# Patient Record
Sex: FEMALE | Race: WHITE | NOT HISPANIC OR LATINO | Employment: FULL TIME | URBAN - METROPOLITAN AREA
[De-identification: names, ages, dates, MRNs, and addresses within clinical notes are randomized per-mention and may not be internally consistent; named-entity substitution may affect disease eponyms.]

---

## 2017-06-15 ENCOUNTER — CONVERSION ENCOUNTER (OUTPATIENT)
Dept: OTHER | Facility: HOSPITAL | Age: 52
End: 2017-06-15

## 2017-12-12 ENCOUNTER — CONVERSION ENCOUNTER (OUTPATIENT)
Dept: OTHER | Facility: HOSPITAL | Age: 52
End: 2017-12-12

## 2018-08-09 ENCOUNTER — CONVERSION ENCOUNTER (OUTPATIENT)
Dept: OTHER | Facility: HOSPITAL | Age: 53
End: 2018-08-09

## 2018-08-09 ENCOUNTER — HOSPITAL ENCOUNTER (OUTPATIENT)
Dept: URGENT CARE | Facility: CLINIC | Age: 53
Setting detail: SPECIMEN
Discharge: HOME | End: 2018-08-09
Attending: EMERGENCY MEDICINE | Admitting: EMERGENCY MEDICINE

## 2018-08-10 LAB
BACTERIA SPEC AEROBE CULT: NORMAL
GRAM STN SPEC: NORMAL
Lab: NORMAL
MICRO REPORT STATUS: NORMAL
SPECIMEN SOURCE: NORMAL

## 2018-08-20 ENCOUNTER — CONVERSION ENCOUNTER (OUTPATIENT)
Dept: OTHER | Facility: HOSPITAL | Age: 53
End: 2018-08-20

## 2019-02-07 ENCOUNTER — CONVERSION ENCOUNTER (OUTPATIENT)
Dept: OTHER | Facility: HOSPITAL | Age: 54
End: 2019-02-07

## 2019-02-13 ENCOUNTER — CONVERSION ENCOUNTER (OUTPATIENT)
Dept: OTHER | Facility: HOSPITAL | Age: 54
End: 2019-02-13

## 2019-06-04 VITALS
BODY MASS INDEX: 33.55 KG/M2 | DIASTOLIC BLOOD PRESSURE: 83 MMHG | OXYGEN SATURATION: 96 % | WEIGHT: 192 LBS | HEART RATE: 83 BPM | HEIGHT: 63 IN | HEIGHT: 63 IN | BODY MASS INDEX: 33.55 KG/M2 | HEIGHT: 63 IN | SYSTOLIC BLOOD PRESSURE: 185 MMHG | WEIGHT: 189.38 LBS | DIASTOLIC BLOOD PRESSURE: 103 MMHG | BODY MASS INDEX: 33.84 KG/M2 | HEART RATE: 94 BPM | BODY MASS INDEX: 33.95 KG/M2 | SYSTOLIC BLOOD PRESSURE: 152 MMHG | WEIGHT: 188 LBS | DIASTOLIC BLOOD PRESSURE: 96 MMHG | HEART RATE: 94 BPM | HEART RATE: 99 BPM | WEIGHT: 189.38 LBS | OXYGEN SATURATION: 98 % | HEIGHT: 63 IN | WEIGHT: 191 LBS | HEART RATE: 107 BPM | RESPIRATION RATE: 18 BRPM | SYSTOLIC BLOOD PRESSURE: 157 MMHG | DIASTOLIC BLOOD PRESSURE: 95 MMHG | DIASTOLIC BLOOD PRESSURE: 88 MMHG | HEIGHT: 63 IN | HEART RATE: 98 BPM | DIASTOLIC BLOOD PRESSURE: 105 MMHG | WEIGHT: 191.6 LBS | BODY MASS INDEX: 33.31 KG/M2 | RESPIRATION RATE: 18 BRPM | SYSTOLIC BLOOD PRESSURE: 171 MMHG | SYSTOLIC BLOOD PRESSURE: 145 MMHG | SYSTOLIC BLOOD PRESSURE: 164 MMHG

## 2019-08-15 ENCOUNTER — OFFICE VISIT (OUTPATIENT)
Dept: NEUROLOGY | Facility: CLINIC | Age: 54
End: 2019-08-15

## 2019-08-15 VITALS
SYSTOLIC BLOOD PRESSURE: 161 MMHG | WEIGHT: 192.4 LBS | HEIGHT: 62 IN | HEART RATE: 87 BPM | BODY MASS INDEX: 35.41 KG/M2 | DIASTOLIC BLOOD PRESSURE: 97 MMHG

## 2019-08-15 DIAGNOSIS — G43.809 MIGRAINE VARIANT: ICD-10-CM

## 2019-08-15 DIAGNOSIS — G43.019 INTRACTABLE MIGRAINE WITHOUT AURA AND WITHOUT STATUS MIGRAINOSUS: Primary | ICD-10-CM

## 2019-08-15 PROBLEM — G43.909 MIGRAINE: Status: ACTIVE | Noted: 2019-08-15

## 2019-08-15 PROCEDURE — 99213 OFFICE O/P EST LOW 20 MIN: CPT | Performed by: PSYCHIATRY & NEUROLOGY

## 2019-08-15 RX ORDER — AMITRIPTYLINE HYDROCHLORIDE 50 MG/1
TABLET, FILM COATED ORAL
COMMUNITY
Start: 2013-04-29 | End: 2019-08-15

## 2019-08-15 RX ORDER — TERBINAFINE HYDROCHLORIDE 250 MG/1
TABLET ORAL DAILY
COMMUNITY
Start: 2013-04-29 | End: 2019-08-15

## 2019-08-15 RX ORDER — DIPHENHYDRAMINE HCL 25 MG
1 CAPSULE ORAL
COMMUNITY
End: 2019-08-15

## 2019-08-15 RX ORDER — FREMANEZUMAB-VFRM 225 MG/1.5ML
INJECTION SUBCUTANEOUS
Refills: 0 | COMMUNITY
Start: 2019-07-12 | End: 2019-08-15 | Stop reason: SDUPTHER

## 2019-08-15 RX ORDER — RIZATRIPTAN BENZOATE 10 MG/1
TABLET ORAL
COMMUNITY
Start: 2012-07-20 | End: 2019-08-15

## 2019-08-15 RX ORDER — MULTIVIT,TX WITH IRON,MINERALS
TABLET, EXTENDED RELEASE ORAL DAILY
COMMUNITY
End: 2019-08-15

## 2019-08-15 RX ORDER — AMLODIPINE BESYLATE 5 MG/1
TABLET ORAL DAILY
COMMUNITY
Start: 2012-05-14 | End: 2019-08-15

## 2019-08-15 RX ORDER — ONDANSETRON HYDROCHLORIDE 8 MG/1
TABLET, FILM COATED ORAL EVERY 8 HOURS
COMMUNITY
Start: 2019-02-07 | End: 2019-08-15

## 2019-08-15 RX ORDER — DESOGESTREL AND ETHINYL ESTRADIOL 0.15-0.03
KIT ORAL DAILY
COMMUNITY
End: 2019-08-15

## 2019-08-15 RX ORDER — SUMATRIPTAN 100 MG/1
TABLET, FILM COATED ORAL
COMMUNITY
Start: 2014-08-04 | End: 2021-03-04

## 2019-08-15 RX ORDER — GABAPENTIN 300 MG/1
1 CAPSULE ORAL
COMMUNITY
Start: 2013-02-04 | End: 2019-08-15

## 2019-08-15 RX ORDER — PSEUDOEPHEDRINE HCL 120 MG/1
TABLET, FILM COATED, EXTENDED RELEASE ORAL
COMMUNITY
End: 2019-08-15

## 2019-08-15 RX ORDER — FREMANEZUMAB-VFRM 225 MG/1.5ML
225 INJECTION SUBCUTANEOUS
Qty: 1 SYRINGE | Refills: 5 | Status: SHIPPED | OUTPATIENT
Start: 2019-08-15 | End: 2019-08-18

## 2019-08-15 RX ORDER — DICLOFENAC POTASSIUM 50 MG/1
TABLET, FILM COATED ORAL
COMMUNITY
End: 2019-08-15

## 2019-08-15 RX ORDER — GLUCOSAMINE/CHONDR SU A SOD 750-600 MG
TABLET ORAL DAILY
COMMUNITY
End: 2019-08-15

## 2019-08-15 NOTE — PROGRESS NOTES
Subjective:     Patient ID: Iris Torres is a 54 y.o. female.    History of Present Illness, Headache and Migraines    6 month f/u for headache, migraine, patient with a hx of severe frequent migraines currently taking Imitrex, Zofran and Zanaflex. Patient was given Ajovy at last office visit and still feels she has the migraines without the head pain and still has nausea. Last migraine was on Monday and lasted most of the day usually wakes up with them. Patient has trouble with getting and staying asleep up every two hours did take melatonin but didn't really help.       The following portions of the patient's history were reviewed and updated as appropriate: allergies, current medications, past family history, past medical history, past social history, past surgical history and problem list.      Current Outpatient Medications:   •  AJOVY 225 MG/1.5ML solution prefilled syringe, INJECT 1 SYRINGE ONCE A MONTH, Disp: , Rfl: 0  •  ondansetron (ZOFRAN) 8 MG tablet, Every 8 (Eight) Hours., Disp: , Rfl:   •  SUMAtriptan (IMITREX) 100 MG tablet, IMITREX 100 MG TABS, Disp: , Rfl:     Review of Systems   Constitutional: Positive for fatigue. Negative for appetite change.   HENT: Negative for congestion, sinus pressure and sinus pain.    Eyes: Negative for pain and itching.   Respiratory: Negative for choking and shortness of breath.    Endocrine: Negative for cold intolerance and heat intolerance.   Genitourinary: Negative for difficulty urinating and frequency.   Musculoskeletal: Negative for back pain and gait problem.   Allergic/Immunologic: Negative for environmental allergies.   Neurological: Positive for headaches. Negative for dizziness, tremors, seizures, syncope, facial asymmetry, speech difficulty, light-headedness and numbness.   Psychiatric/Behavioral: Negative for agitation and confusion.          I have reviewed ROS completed by medical assistant.     Objective:    Pt alert and in no  distress      Assessment/Plan:    Iris was seen today for migraine.    Diagnoses and all orders for this visit:    Intractable migraine without aura and without status migrainosus    Migraine variant      Probable migraine spells with out headache and continued migraine headaches.  2-3 days per week  Migraine spells with head pain about one day per week          This document has been electronically signed by Joseph Seipel, MD on August 15, 2019 5:31 PM

## 2019-08-16 ENCOUNTER — TELEPHONE (OUTPATIENT)
Dept: NEUROLOGY | Facility: CLINIC | Age: 54
End: 2019-08-16

## 2019-08-16 NOTE — TELEPHONE ENCOUNTER
I called to do PA on Ajovy, they don't cover it. They do cover Aimovig and Emgality. Do you want to change. If so you will need to send order to her pharmacy. Thanks

## 2019-08-21 ENCOUNTER — TELEPHONE (OUTPATIENT)
Dept: NEUROLOGY | Facility: CLINIC | Age: 54
End: 2019-08-21

## 2020-02-19 ENCOUNTER — OFFICE VISIT (OUTPATIENT)
Dept: NEUROLOGY | Facility: CLINIC | Age: 55
End: 2020-02-19

## 2020-02-19 VITALS
DIASTOLIC BLOOD PRESSURE: 85 MMHG | BODY MASS INDEX: 36.22 KG/M2 | WEIGHT: 196.8 LBS | HEART RATE: 80 BPM | SYSTOLIC BLOOD PRESSURE: 163 MMHG | HEIGHT: 62 IN

## 2020-02-19 DIAGNOSIS — G43.019 INTRACTABLE MIGRAINE WITHOUT AURA AND WITHOUT STATUS MIGRAINOSUS: Primary | ICD-10-CM

## 2020-02-19 DIAGNOSIS — F32.A DEPRESSION, UNSPECIFIED DEPRESSION TYPE: ICD-10-CM

## 2020-02-19 PROCEDURE — 99213 OFFICE O/P EST LOW 20 MIN: CPT | Performed by: PSYCHIATRY & NEUROLOGY

## 2020-02-19 NOTE — PROGRESS NOTES
Subjective: Migraines    Patient ID: Iris Torres is a 55 y.o. female.    Chief complaint: migraine    History of Present Illness, 6 month f/u     Migraine variant, patient doing well with emgality and Imitrex. Patient had her first dose in february so far doing well. Last migraine was last Tuesday and wedneday not as severe and doesn't have them as frequent.   ajovy helped with the migraine, helped 80  To 85%, no noted side effects  Fewer headaches, but has had spells of the migraine associated symptoms wo headache.   Started on Emgality this month.     Pt had blood in urine, no etiology found.     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    Family History   Problem Relation Age of Onset   • Hypertension Mother    • Stroke Father    • Stroke Brother    • Migraines Paternal Aunt        Past Medical History:   Diagnosis Date   • Cellulitis    • Depression    • Migraine        Social History     Socioeconomic History   • Marital status:      Spouse name: Not on file   • Number of children: Not on file   • Years of education: Not on file   • Highest education level: Not on file   Tobacco Use   • Smoking status: Former Smoker   • Smokeless tobacco: Never Used   Substance and Sexual Activity   • Alcohol use: No     Frequency: Never   • Drug use: No         Current Outpatient Medications:   •  galcanezumab-gnlm (EMGALITY) 120 MG/ML prefilled syringe, Inject 1 mL under the skin into the appropriate area as directed Every 30 (Thirty) Days. First day two 120mg doses, then 120 monthly, Disp: 2.24 mL, Rfl: 0  •  SUMAtriptan (IMITREX) 100 MG tablet, IMITREX 100 MG TABS, Disp: , Rfl:      Review of Systems   Constitutional: Negative for appetite change and fatigue.   HENT: Negative for hearing loss and postnasal drip.    Eyes: Negative for discharge and itching.   Respiratory: Negative for cough and shortness of breath.    Gastrointestinal: Negative for constipation and diarrhea.   Endocrine: Negative for cold intolerance and heat intolerance.   Genitourinary: Negative for difficulty urinating and frequency.   Musculoskeletal: Negative for back pain and neck pain.   Allergic/Immunologic: Negative for environmental allergies.   Neurological: Positive for headaches. Negative for dizziness, tremors, seizures, syncope, facial asymmetry, speech difficulty, weakness, light-headedness and numbness.   Psychiatric/Behavioral: Negative for agitation and confusion.          I have reviewed ROS completed by medical assistant.     Objective:    Neurologic Exam     Mental Status   Oriented to person, place, and time.       Physical Exam   Constitutional: She is oriented to person, place, and time. She appears well-developed.   Neurological: She is oriented to person, place, and time.   Psychiatric: She has a normal mood and affect.   Vitals reviewed.      Assessment/Plan:    Iris was seen today for migraine.    Diagnoses and all orders for this visit:    Intractable migraine without aura and without status migrainosus    Depression, unspecified depression type      Continue Emgality. And imitrex.    Depression improved. With less stress and fewer headaches.    This document has been electronically signed by Joseph Seipel, MD on February 19, 2020 4:45 PM

## 2020-09-04 ENCOUNTER — TELEPHONE (OUTPATIENT)
Dept: NEUROLOGY | Facility: CLINIC | Age: 55
End: 2020-09-04

## 2020-09-04 NOTE — TELEPHONE ENCOUNTER
PT STATES THE PRIOR AUTH FOR HER EMGALITY  ON 8-, SHE STATES THAT [facility] NEEDS A NEW ONE TO BE ABLE TO FILL AGAIN. PLEASE ADVISE.         Iris Torres (Self) [phone] (H)

## 2021-03-04 ENCOUNTER — OFFICE VISIT (OUTPATIENT)
Dept: NEUROLOGY | Facility: CLINIC | Age: 56
End: 2021-03-04

## 2021-03-04 VITALS
BODY MASS INDEX: 33.22 KG/M2 | HEART RATE: 83 BPM | TEMPERATURE: 97.5 F | DIASTOLIC BLOOD PRESSURE: 76 MMHG | WEIGHT: 194.6 LBS | HEIGHT: 64 IN | SYSTOLIC BLOOD PRESSURE: 152 MMHG

## 2021-03-04 DIAGNOSIS — G43.119 INTRACTABLE MIGRAINE WITH AURA WITHOUT STATUS MIGRAINOSUS: ICD-10-CM

## 2021-03-04 DIAGNOSIS — E66.09 CLASS 1 OBESITY DUE TO EXCESS CALORIES WITH BODY MASS INDEX (BMI) OF 33.0 TO 33.9 IN ADULT, UNSPECIFIED WHETHER SERIOUS COMORBIDITY PRESENT: Primary | ICD-10-CM

## 2021-03-04 PROBLEM — G43.909 HEADACHE, MIGRAINE: Status: ACTIVE | Noted: 2021-03-04

## 2021-03-04 PROBLEM — Z87.891 HISTORY OF TOBACCO USE: Status: ACTIVE | Noted: 2021-03-04

## 2021-03-04 PROBLEM — R53.81 MALAISE AND FATIGUE: Status: ACTIVE | Noted: 2021-03-04

## 2021-03-04 PROBLEM — F43.0 ACUTE REACTION TO STRESS: Status: ACTIVE | Noted: 2021-03-04

## 2021-03-04 PROBLEM — A08.4 VIRAL INTESTINAL INFECTION: Status: ACTIVE | Noted: 2019-02-07

## 2021-03-04 PROBLEM — E66.9 OBESITY: Status: ACTIVE | Noted: 2021-03-04

## 2021-03-04 PROBLEM — R53.83 MALAISE AND FATIGUE: Status: ACTIVE | Noted: 2021-03-04

## 2021-03-04 PROBLEM — E66.3 OVERWEIGHT: Status: ACTIVE | Noted: 2021-03-04

## 2021-03-04 PROBLEM — R31.29 MICROSCOPIC HEMATURIA: Status: ACTIVE | Noted: 2021-03-04

## 2021-03-04 PROBLEM — B35.3 DERMATOPHYTOSIS OF FOOT: Status: ACTIVE | Noted: 2021-03-04

## 2021-03-04 PROBLEM — Z86.19 HISTORY OF OTHER INFECTIOUS OR PARASITIC DISEASE: Status: ACTIVE | Noted: 2021-03-04

## 2021-03-04 PROBLEM — L02.211 ABSCESS OF SKIN OF ABDOMEN: Status: ACTIVE | Noted: 2018-08-09

## 2021-03-04 PROBLEM — M53.80 OTHER BACK SYMPTOMS: Status: ACTIVE | Noted: 2021-03-04

## 2021-03-04 PROBLEM — L30.9 ECZEMA: Status: ACTIVE | Noted: 2021-03-04

## 2021-03-04 PROBLEM — L03.311 CELLULITIS OF ABDOMINAL WALL: Status: ACTIVE | Noted: 2018-08-09

## 2021-03-04 PROBLEM — F32.A DEPRESSION: Status: ACTIVE | Noted: 2021-03-04

## 2021-03-04 PROBLEM — G43.109 MIGRAINE WITH AURA: Status: ACTIVE | Noted: 2021-03-04

## 2021-03-04 PROBLEM — I10 BENIGN ESSENTIAL HYPERTENSION: Status: ACTIVE | Noted: 2021-03-04

## 2021-03-04 PROBLEM — Z82.3 FAMILY HISTORY OF CEREBROVASCULAR ACCIDENT (CVA): Status: ACTIVE | Noted: 2021-03-04

## 2021-03-04 PROBLEM — Z00.00 ROUTINE GENERAL MEDICAL EXAMINATION AT A HEALTH CARE FACILITY: Status: ACTIVE | Noted: 2021-03-04

## 2021-03-04 PROBLEM — D72.829 LEUKOCYTOSIS: Status: ACTIVE | Noted: 2021-03-04

## 2021-03-04 PROBLEM — L98.9 DISORDER OF SKIN AND SUBCUTANEOUS TISSUE: Status: ACTIVE | Noted: 2021-03-04

## 2021-03-04 PROBLEM — G43.019 INTRACTABLE MIGRAINE WITHOUT AURA: Status: ACTIVE | Noted: 2021-03-04

## 2021-03-04 PROCEDURE — 99213 OFFICE O/P EST LOW 20 MIN: CPT | Performed by: PSYCHIATRY & NEUROLOGY

## 2021-03-04 RX ORDER — ACETAMINOPHEN 500 MG
500 TABLET ORAL EVERY 6 HOURS PRN
COMMUNITY

## 2021-03-04 NOTE — PROGRESS NOTES
"Chief Complaint  Migraine (yearly f/u )    Subjective          Iris Torres presents to [facility]UR[facility] for   Yearly f/u     Intractable migraine without aura and without status migrainosus, treated with Emgality and tylenol. Stable with medication fewer migraines worse with congestion had a few aura's in January and February but very few. Pt had the visual aura, hour or less, aura expands over about 20 min to one half hour.     Pt has had no disabling migraine.       Depression, unspecified depression type, improved with less headaches.     Pt. retired one year ago January.           Current Outpatient Medications:   •  acetaminophen (TYLENOL) 500 MG tablet, Take 500 mg by mouth Every 6 (Six) Hours As Needed for Mild Pain ., Disp: , Rfl:   •  galcanezumab-gnlm (EMGALITY) 120 MG/ML prefilled syringe, Inject 1 mL under the skin into the appropriate area as directed Every 30 (Thirty) Days., Disp: 1 pen, Rfl: 11    Review of Systems   Constitutional: Negative for appetite change and fatigue.   HENT: Negative for hearing loss and postnasal drip.    Eyes: Negative for discharge and itching.   Respiratory: Negative for cough and shortness of breath.    Gastrointestinal: Negative for constipation and diarrhea.   Endocrine: Negative for cold intolerance and heat intolerance.   Genitourinary: Negative for difficulty urinating and frequency.   Musculoskeletal: Negative for back pain and neck pain.   Allergic/Immunologic: Negative for environmental allergies.   Neurological: Positive for headaches. Negative for dizziness, tremors, seizures, syncope, facial asymmetry, speech difficulty, weakness, light-headedness and numbness.   Psychiatric/Behavioral: Negative for agitation and confusion.          Objective:    Vital Signs:   /76 (BP Location: Left arm, Patient Position: Sitting, Cuff Size: Adult)   Pulse 83   Temp 97.5 °F (36.4 °C)   Ht 162.6 cm (64\")   Wt 88.3 kg (194 lb 9.6 oz)   BMI " 33.40 kg/m²     Physical Exam  Neurological:      Mental Status: She is oriented to person, place, and time.        Result Review :                Neurologic Exam     Mental Status   Oriented to person, place, and time.   Attention: normal.   Level of consciousness: alert        Assessment and Plan    Diagnoses and all orders for this visit:    1. Class 1 obesity due to excess calories with body mass index (BMI) of 33.0 to 33.9 in adult, unspecified whether serious comorbidity present (Primary)    2. Intractable migraine with aura without status migrainosus    Other orders  -     galcanezumab-gnlm (EMGALITY) 120 MG/ML prefilled syringe; Inject 1 mL under the skin into the appropriate area as directed Every 30 (Thirty) Days.  Dispense: 1 pen; Refill: 11       Great response to the Emgaility  Continue emgaility for next year..        Follow Up   Return in about 1 year (around 3/4/2022).  Patient was given instructions and counseling regarding her condition or for health maintenance advice. Please see specific information pulled into the AVS if appropriate.             This document has been electronically signed by Joseph Seipel, MD on March 4, 2021 09:11 EST

## 2022-03-07 ENCOUNTER — OFFICE VISIT (OUTPATIENT)
Dept: NEUROLOGY | Facility: CLINIC | Age: 57
End: 2022-03-07

## 2022-03-07 VITALS
SYSTOLIC BLOOD PRESSURE: 148 MMHG | HEIGHT: 64 IN | BODY MASS INDEX: 30.73 KG/M2 | DIASTOLIC BLOOD PRESSURE: 90 MMHG | TEMPERATURE: 97.3 F | WEIGHT: 180 LBS

## 2022-03-07 DIAGNOSIS — G43.809 MIGRAINE VARIANT: Primary | ICD-10-CM

## 2022-03-07 PROCEDURE — 99213 OFFICE O/P EST LOW 20 MIN: CPT | Performed by: PSYCHIATRY & NEUROLOGY

## 2022-03-07 NOTE — PROGRESS NOTES
"Chief Complaint  Headache    Subjective          Iris Torres presents to [facility] NEUROLOGY for headaches  History of Present Illness     Patient is here for follow up on migraines treated with Emality and tylenol.  She has had few to none of migraines since last visit.   Doing much better since retired. And taking the Emgality, and post menopausal     ===========================================  3-4-2021  Iris Torres presents to [facility] NEUROLOGY for   Yearly f/u      Intractable migraine without aura and without status migrainosus, treated with Emgality and tylenol. Stable with medication fewer migraines worse with congestion had a few aura's in January and February but very few. Pt had the visual aura, hour or less, aura expands over about 20 min to one half hour.      Pt has had no disabling migraine.       Depression, unspecified depression type, improved with less headaches.      Pt. retired one year ago January.           Current Outpatient Medications:   •  acetaminophen (TYLENOL) 500 MG tablet, Take 500 mg by mouth Every 6 (Six) Hours As Needed for Mild Pain ., Disp: , Rfl:   •  galcanezumab-gnlm (EMGALITY) 120 MG/ML prefilled syringe, Inject 1 mL under the skin into the appropriate area as directed Every 30 (Thirty) Days., Disp: 1 pen, Rfl: 11    Review of Systems   Constitutional: Negative.    HENT: Negative.    Eyes: Negative.    Respiratory: Negative.    Gastrointestinal: Negative.    Genitourinary: Negative.    Musculoskeletal: Negative.    Neurological: Negative.    Psychiatric/Behavioral: Negative.           Objective:    Vital Signs:   /90 (BP Location: Left arm, Patient Position: Sitting)   Temp 97.3 °F (36.3 °C)   Ht 162.6 cm (64\")   Wt 81.6 kg (180 lb)   BMI 30.90 kg/m²     Physical Exam  Vitals reviewed.   HENT:      Head: Normocephalic.   Pulmonary:      Effort: Pulmonary effort is normal. No respiratory distress.   Neurological:      " General: No focal deficit present.      Mental Status: She is alert.   Psychiatric:         Mood and Affect: Mood normal.        Result Review :                  Assessment and Plan    Diagnoses and all orders for this visit:    1. Migraine variant (Primary)     No migraine in past year  Will discontinue the Emgality now    Hopefully will stay with out headaches now that she is older and retired.     Follow Up   Return if symptoms worsen or fail to improve.  Patient was given instructions and counseling regarding her condition or for health maintenance advice. Please see specific information pulled into the AVS if appropriate.     This document has been electronically signed by Joseph Seipel, MD on March 7, 2022 15:50 EST

## 2024-09-17 ENCOUNTER — TREATMENT (OUTPATIENT)
Dept: PHYSICAL THERAPY | Facility: CLINIC | Age: 59
End: 2024-09-17
Payer: COMMERCIAL

## 2024-09-17 DIAGNOSIS — M25.60 JOINT STIFFNESS OF SPINE: ICD-10-CM

## 2024-09-17 DIAGNOSIS — M62.81 WEAKNESS OF TRUNK MUSCULATURE: ICD-10-CM

## 2024-09-17 DIAGNOSIS — M54.42 ACUTE LEFT-SIDED LOW BACK PAIN WITH LEFT-SIDED SCIATICA: Primary | ICD-10-CM

## 2024-09-17 PROCEDURE — 97110 THERAPEUTIC EXERCISES: CPT

## 2024-09-17 PROCEDURE — 97161 PT EVAL LOW COMPLEX 20 MIN: CPT

## 2024-09-17 PROCEDURE — 97112 NEUROMUSCULAR REEDUCATION: CPT

## 2024-09-19 ENCOUNTER — TREATMENT (OUTPATIENT)
Dept: PHYSICAL THERAPY | Facility: CLINIC | Age: 59
End: 2024-09-19
Payer: COMMERCIAL

## 2024-09-19 DIAGNOSIS — M54.42 ACUTE LEFT-SIDED LOW BACK PAIN WITH LEFT-SIDED SCIATICA: Primary | ICD-10-CM

## 2024-09-19 DIAGNOSIS — M25.60 JOINT STIFFNESS OF SPINE: ICD-10-CM

## 2024-09-19 DIAGNOSIS — M62.81 WEAKNESS OF TRUNK MUSCULATURE: ICD-10-CM

## 2024-09-24 ENCOUNTER — TREATMENT (OUTPATIENT)
Dept: PHYSICAL THERAPY | Facility: CLINIC | Age: 59
End: 2024-09-24
Payer: COMMERCIAL

## 2024-09-24 DIAGNOSIS — M62.81 WEAKNESS OF TRUNK MUSCULATURE: ICD-10-CM

## 2024-09-24 DIAGNOSIS — M25.60 JOINT STIFFNESS OF SPINE: ICD-10-CM

## 2024-09-24 DIAGNOSIS — M54.42 ACUTE LEFT-SIDED LOW BACK PAIN WITH LEFT-SIDED SCIATICA: Primary | ICD-10-CM

## 2024-09-24 PROCEDURE — 97110 THERAPEUTIC EXERCISES: CPT | Performed by: PHYSICAL THERAPIST

## 2024-09-24 PROCEDURE — 97112 NEUROMUSCULAR REEDUCATION: CPT | Performed by: PHYSICAL THERAPIST

## 2024-09-26 ENCOUNTER — TREATMENT (OUTPATIENT)
Dept: PHYSICAL THERAPY | Facility: CLINIC | Age: 59
End: 2024-09-26
Payer: COMMERCIAL

## 2024-09-26 DIAGNOSIS — M25.60 JOINT STIFFNESS OF SPINE: ICD-10-CM

## 2024-09-26 DIAGNOSIS — M54.42 ACUTE LEFT-SIDED LOW BACK PAIN WITH LEFT-SIDED SCIATICA: Primary | ICD-10-CM

## 2024-09-26 DIAGNOSIS — M62.81 WEAKNESS OF TRUNK MUSCULATURE: ICD-10-CM

## 2024-10-01 ENCOUNTER — TREATMENT (OUTPATIENT)
Dept: PHYSICAL THERAPY | Facility: CLINIC | Age: 59
End: 2024-10-01
Payer: COMMERCIAL

## 2024-10-01 DIAGNOSIS — M25.60 JOINT STIFFNESS OF SPINE: ICD-10-CM

## 2024-10-01 DIAGNOSIS — M54.42 ACUTE LEFT-SIDED LOW BACK PAIN WITH LEFT-SIDED SCIATICA: Primary | ICD-10-CM

## 2024-10-01 DIAGNOSIS — M62.81 WEAKNESS OF TRUNK MUSCULATURE: ICD-10-CM

## 2024-10-01 PROCEDURE — 97110 THERAPEUTIC EXERCISES: CPT

## 2024-10-01 PROCEDURE — 97112 NEUROMUSCULAR REEDUCATION: CPT

## 2024-10-01 NOTE — PROGRESS NOTES
Physical Therapy Daily Treatment Note                        [address] [place] [address] [address] [place] [address]    Patient: Iris Torres   : 1965  Diagnosis/ICD-10 Code:  Acute left-sided low back pain with left-sided sciatica [M54.42]  Referring practitioner: QUINTIN Cabezas  Date of Initial Visit: Type: THERAPY  Noted: 2024  Today's Date: 10/1/2024  Patient seen for 5 sessions           Subjective   Pt reports doing too much at home working on potting plants and has some increased pain.   MRI reveals moderate disc protrusion. The neuro PA recommends cont with PT. Return in a month and if not better, possibly do an injection.     Objective   See Exercise, Manual, and Modality Logs for complete treatment.     Assessment/Plan     Pt performed all therapeutic exercises with good technique. Pt continues to have limitations with strength and flexibility. Pt will benefit from continued PT to address these limitations.       Timed:  Therapeutic Exercise:    25     mins  53930;     Neuromuscular Nathanael:   10    mins  89942;  Manual Therapy:    0     mins  32871;    Gait Trainin     mins  22187;     Ultrasound:                    0     mins  44451  Therapeutic Activity:     0     mins  73650;    Un-timed:  Electrical Stimulation:    0     mins  13925 ( );  Mechanical Traction      00     mins  40186  Dry Needling     0     mins self-pay        Timed Treatment:   35   mins   Total Treatment:     35   mins    Ned Calderon PT  Physical Therapist    Electronically signed 10/1/2024    [place] License:  PT - [id number]  [place] License: PT - [id number]

## 2024-10-03 ENCOUNTER — TREATMENT (OUTPATIENT)
Dept: PHYSICAL THERAPY | Facility: CLINIC | Age: 59
End: 2024-10-03
Payer: COMMERCIAL

## 2024-10-03 DIAGNOSIS — M54.42 ACUTE LEFT-SIDED LOW BACK PAIN WITH LEFT-SIDED SCIATICA: Primary | ICD-10-CM

## 2024-10-03 DIAGNOSIS — M62.81 WEAKNESS OF TRUNK MUSCULATURE: ICD-10-CM

## 2024-10-03 DIAGNOSIS — M25.60 JOINT STIFFNESS OF SPINE: ICD-10-CM

## 2024-10-03 NOTE — PROGRESS NOTES
Physical Therapy Daily Treatment Note                        [address] [place] [address] [address] [place] [address]    Patient: Iris Torres   : 1965  Diagnosis/ICD-10 Code:  Acute left-sided low back pain with left-sided sciatica [M54.42]  Referring practitioner: QUINTIN Cabezas  Date of Initial Visit: Type: THERAPY  Noted: 2024  Today's Date: 10/3/2024  Patient seen for 6 sessions           Subjective   Overall feeling good. Mild pain after being up for a while.   Pt asking about inversion table that her  uses. I advised her to use it if it feels good, not to cont if uncomfortable.     Objective   See Exercise, Manual, and Modality Logs for complete treatment.     Assessment/Plan     Pt performed all therapeutic exercises with good technique. Pt continues to have limitations with strength and flexibility. Pt will benefit from continued PT to address these limitations.       Timed:  Therapeutic Exercise:    25     mins  45557;     Neuromuscular Nathanael:   12    mins  25030;  Manual Therapy:    0     mins  11466;    Gait Trainin     mins  09684;     Ultrasound:                    0     mins  09814  Therapeutic Activity:     0     mins  70723;    Un-timed:  Electrical Stimulation:    0     mins  28609 ( );  Mechanical Traction      0     mins  88382  Dry Needling     0     mins self-pay        Timed Treatment:   37   mins   Total Treatment:     37   mins    Ned Calderon, PT  Physical Therapist    Electronically signed 10/3/2024    [place] License:  PT - [id number]  [place] License: PT - [id number]

## 2024-10-07 ENCOUNTER — TREATMENT (OUTPATIENT)
Dept: PHYSICAL THERAPY | Facility: CLINIC | Age: 59
End: 2024-10-07
Payer: COMMERCIAL

## 2024-10-07 DIAGNOSIS — M54.42 ACUTE LEFT-SIDED LOW BACK PAIN WITH LEFT-SIDED SCIATICA: Primary | ICD-10-CM

## 2024-10-07 DIAGNOSIS — M25.60 JOINT STIFFNESS OF SPINE: ICD-10-CM

## 2024-10-07 DIAGNOSIS — M62.81 WEAKNESS OF TRUNK MUSCULATURE: ICD-10-CM

## 2024-10-09 ENCOUNTER — TREATMENT (OUTPATIENT)
Dept: PHYSICAL THERAPY | Facility: CLINIC | Age: 59
End: 2024-10-09
Payer: COMMERCIAL

## 2024-10-09 DIAGNOSIS — M25.60 JOINT STIFFNESS OF SPINE: ICD-10-CM

## 2024-10-09 DIAGNOSIS — M62.81 WEAKNESS OF TRUNK MUSCULATURE: ICD-10-CM

## 2024-10-09 DIAGNOSIS — M54.42 ACUTE LEFT-SIDED LOW BACK PAIN WITH LEFT-SIDED SCIATICA: Primary | ICD-10-CM

## 2024-10-09 NOTE — PROGRESS NOTES
Physical Therapy Daily Treatment Note                        [address] [place] [address]   [address] [place] [address]    Patient: Iris Torres   : 1965  Diagnosis/ICD-10 Code:  Acute left-sided low back pain with left-sided sciatica [M54.42]  Referring practitioner: QUINTIN Cabezas  Date of Initial Visit: Type: THERAPY  Noted: 2024  Today's Date: 10/9/2024  Patient seen for 8 sessions           Subjective   Pt doing well. She has some pain at end range trunk extension    Objective   See Exercise, Manual, and Modality Logs for complete treatment.   Did not perform standing trunk ext due to mild pain.     Assessment/Plan     Pt performed all therapeutic exercises with good technique. Pt continues to have limitations with strength and flexibility. Pt will benefit from continued PT to address these limitations.       Timed:  Therapeutic Exercise:    24     mins  62760;     Neuromuscular Nathanael:   14    mins  69480;  Manual Therapy:    0     mins  60691;    Gait Trainin     mins  23631;     Ultrasound:                    0     mins  49778  Therapeutic Activity:     0     mins  24395;    Un-timed:  Electrical Stimulation:    0     mins  23903 ( );  Mechanical Traction      0     mins  20702  Dry Needling     0     mins self-pay        Timed Treatment:   38   mins   Total Treatment:     38   mins    Ned Calderon, PT  Physical Therapist    Electronically signed 10/9/2024    [place] License:  PT - [id number]  [place] License: PT - [id number]

## 2024-10-15 ENCOUNTER — TREATMENT (OUTPATIENT)
Dept: PHYSICAL THERAPY | Facility: CLINIC | Age: 59
End: 2024-10-15
Payer: COMMERCIAL

## 2024-10-15 DIAGNOSIS — M25.60 JOINT STIFFNESS OF SPINE: ICD-10-CM

## 2024-10-15 DIAGNOSIS — M54.42 ACUTE LEFT-SIDED LOW BACK PAIN WITH LEFT-SIDED SCIATICA: Primary | ICD-10-CM

## 2024-10-15 DIAGNOSIS — M62.81 WEAKNESS OF TRUNK MUSCULATURE: ICD-10-CM

## 2024-10-15 PROCEDURE — 97112 NEUROMUSCULAR REEDUCATION: CPT | Performed by: PHYSICAL THERAPIST

## 2024-10-15 PROCEDURE — 97530 THERAPEUTIC ACTIVITIES: CPT | Performed by: PHYSICAL THERAPIST

## 2024-10-15 NOTE — PROGRESS NOTES
Physical Therapy Daily Treatment Note      Patient: Iris Torres   : 1965  Diagnosis/ICD-10 Code:  Acute left-sided low back pain with left-sided sciatica [M54.42]  Referring practitioner: QUINTIN Cabezas  Date of Initial Visit: Type: THERAPY  Noted: 2024  Today's Date: 10/15/2024  Patient seen for 9 sessions         Iris Torres reports: she is ~95% improved since starting PT and has been very wade to have pain dissipate. Pt. States she has been doing a lot of exercising since she has felt better and has not had much backlash at all.    Objective   See Exercise, Manual, and Modality Logs for complete treatment.     Assessment/Plan  Pt. Responds well to strengthening today and stretches helped Pt. become more fluid in movements and stretches instead of appearing to stiff.    Goals  Plan Goals: Short Term Goals: 4 weeks  1.Pt will be independent with all exercises assigned to HEP.  2.         Pt will report pain decreased to a rating of 0.      Long Term Goals: 12 weeks  1.         The patient will report no feeling of instability and no left LE muscle twitching or cramping.    2.   The patient will demonstrate lumbar AROM as follows: 20 degrees of extension and 25 degrees lateral flexions.  3.   The patient will demonstrate 25 % limitation as scored on the REJI.  4.  Pt will be able to lay on her left side without any increased pain to allow better sleep at night    Progress strengthening /stabilization /functional activity           Timed:           Therapeutic Exercise:    10     mins  06844;     Neuromuscular Nathanael:    10    mins  39172;    Therapeutic Activity:     10     mins  57868;       Timed Treatment:   30   mins   Total Treatment:     30   mins    Hope Stewart PTA  Physical Therapist Assistant License #[id number]

## 2024-10-17 ENCOUNTER — TREATMENT (OUTPATIENT)
Dept: PHYSICAL THERAPY | Facility: CLINIC | Age: 59
End: 2024-10-17
Payer: COMMERCIAL

## 2024-10-17 DIAGNOSIS — M25.60 JOINT STIFFNESS OF SPINE: ICD-10-CM

## 2024-10-17 DIAGNOSIS — M62.81 WEAKNESS OF TRUNK MUSCULATURE: ICD-10-CM

## 2024-10-17 DIAGNOSIS — M54.42 ACUTE LEFT-SIDED LOW BACK PAIN WITH LEFT-SIDED SCIATICA: Primary | ICD-10-CM

## 2024-10-17 NOTE — PROGRESS NOTES
Physical Therapy Daily Treatment Note and D/C Summary                        [address] [place] [address] [address] [place] [address]    Patient: Iris Torres   : 1965  Diagnosis/ICD-10 Code:  Acute left-sided low back pain with left-sided sciatica [M54.42]  Referring practitioner: QUINTIN Cabezas  Date of Initial Visit: Type: THERAPY  Noted: 2024  Today's Date: 10/17/2024  Patient seen for 10 sessions           Subjective   Pt feeling very well lately. Pt feels she is ready for discharge.     Objective   See Exercise, Manual, and Modality Logs for complete treatment.     Assessment/Plan     Pt has made very good progress.   All goals achieved. Left LE numbness now gone in thigh and very minimal in lower leg. Pt feels she will be able to cont with HEP.   Pt discharged from PT now.     Timed:  Therapeutic Exercise:    30     mins  30994;     Neuromuscular Nathanael:   12    mins  63076;  Manual Therapy:    0     mins  72725;    Gait Trainin     mins  69025;     Ultrasound:                    0     mins  69105  Therapeutic Activity:     0     mins  21920;    Un-timed:  Electrical Stimulation:    0     mins  61999 ( );  Mechanical Traction      0     mins  81592  Dry Needling     0     mins self-pay        Timed Treatment:   42   mins   Total Treatment:     42   mins    Ned Calderon, PT  Physical Therapist    Electronically signed 10/17/2024    [place] License:  PT - [id number]  [place] License: PT - [id number]